# Patient Record
Sex: MALE | URBAN - METROPOLITAN AREA
[De-identification: names, ages, dates, MRNs, and addresses within clinical notes are randomized per-mention and may not be internally consistent; named-entity substitution may affect disease eponyms.]

---

## 2020-04-06 ENCOUNTER — APPOINTMENT (OUTPATIENT)
Dept: URBAN - METROPOLITAN AREA CLINIC 217 | Age: 25
Setting detail: DERMATOLOGY
End: 2020-04-06

## 2020-04-06 DIAGNOSIS — L50.5 CHOLINERGIC URTICARIA: ICD-10-CM

## 2020-04-06 PROCEDURE — 99202 OFFICE O/P NEW SF 15 MIN: CPT | Mod: 95

## 2020-04-06 PROCEDURE — OTHER TREATMENT REGIMEN: OTHER

## 2020-04-06 PROCEDURE — OTHER DIAGNOSIS COMMENT: OTHER

## 2020-04-06 PROCEDURE — OTHER COUNSELING: OTHER

## 2020-04-06 PROCEDURE — OTHER REASON FOR TELEMEDICINE VISIT: OTHER

## 2020-04-06 PROCEDURE — OTHER MIPS QUALITY: OTHER

## 2020-04-06 PROCEDURE — OTHER CONSENT FOR TELEMEDICINE VISIT OBTAINED: OTHER

## 2020-04-06 NOTE — PROCEDURE: CONSENT FOR TELEMEDICINE VISIT OBTAINED
Consent Text: In view of current COVID-19 pandemic and need for social distancing, pt verbally consents to teledermatology visit

## 2020-04-06 NOTE — PROCEDURE: TREATMENT REGIMEN
Otc Regimen: Allegra 180 mg PO daily x1 month then PRN
Plan: Informed pt antihistamine may help eliminate or blunt rash. Will have him f/u in 1 month to ensure treatment helping. Recommended pt take his own photos when rash is active.  Monitor effects of above tx between now and next appt.   Discussed where to get more information on urticaria and cholinergic urticaria via my website and links to [facility] website.  Answered questions to patient satisfaction.
Detail Level: Detailed

## 2020-04-06 NOTE — PROCEDURE: MIPS QUALITY
Quality 226: Preventive Care And Screening: Tobacco Use: Screening And Cessation Intervention: Patient screened for tobacco use and is an ex/non-smoker
Quality 110: Preventive Care And Screening: Influenza Immunization: Influenza Immunization Ordered or Recommended, but not Administered due to system reason
Detail Level: Detailed
Quality 130: Documentation Of Current Medications In The Medical Record: Current Medications Documented

## 2020-05-06 ENCOUNTER — APPOINTMENT (OUTPATIENT)
Dept: URBAN - METROPOLITAN AREA CLINIC 217 | Age: 25
Setting detail: DERMATOLOGY
End: 2020-05-06

## 2020-05-06 DIAGNOSIS — L259 CONTACT DERMATITIS AND OTHER ECZEMA, UNSPECIFIED CAUSE: ICD-10-CM

## 2020-05-06 PROBLEM — L23.9 ALLERGIC CONTACT DERMATITIS, UNSPECIFIED CAUSE: Status: ACTIVE | Noted: 2020-05-06

## 2020-05-06 PROCEDURE — OTHER COUNSELING: OTHER

## 2020-05-06 PROCEDURE — OTHER REASON FOR TELEMEDICINE VISIT: OTHER

## 2020-05-06 PROCEDURE — 99212 OFFICE O/P EST SF 10 MIN: CPT | Mod: 95

## 2020-05-06 PROCEDURE — OTHER CONSENT FOR TELEMEDICINE VISIT OBTAINED: OTHER

## 2020-05-06 PROCEDURE — OTHER TREATMENT REGIMEN: OTHER

## 2020-05-06 PROCEDURE — OTHER DIAGNOSIS COMMENT: OTHER

## 2020-05-06 ASSESSMENT — SEVERITY ASSESSMENT: SEVERITY: CLEAR

## 2020-05-06 NOTE — PROCEDURE: DIAGNOSIS COMMENT
Detail Level: Simple
Comment: Review of patient’s pics of pics suggest less likely cholinergic urticaria as previously thought, more suspicious for some contact dermatitis or heat/temp induced allergic reaction.

## 2020-05-06 NOTE — PROCEDURE: TREATMENT REGIMEN
Otc Regimen: Allegra 180 mg PO daily PRN
Detail Level: Detailed
Plan: Explained to patient how sometimes sweating and moisture can wick chemicals from clothing to skin.  Encouraged to monitor for patterns of eruptions and avoidance if causative exposure identified. \\n Suggested possible allergy testing through allergist if becomes ongoing problem and patient cannot identify trigger. He expressed understanding